# Patient Record
Sex: MALE | Race: WHITE | NOT HISPANIC OR LATINO | Employment: OTHER | URBAN - METROPOLITAN AREA
[De-identification: names, ages, dates, MRNs, and addresses within clinical notes are randomized per-mention and may not be internally consistent; named-entity substitution may affect disease eponyms.]

---

## 2022-11-09 ENCOUNTER — OFFICE VISIT (OUTPATIENT)
Dept: URGENT CARE | Age: 27
End: 2022-11-09

## 2022-11-09 VITALS
DIASTOLIC BLOOD PRESSURE: 74 MMHG | OXYGEN SATURATION: 98 % | TEMPERATURE: 97.6 F | HEIGHT: 71 IN | HEART RATE: 79 BPM | RESPIRATION RATE: 20 BRPM | SYSTOLIC BLOOD PRESSURE: 122 MMHG | BODY MASS INDEX: 28.22 KG/M2 | WEIGHT: 201.6 LBS

## 2022-11-09 DIAGNOSIS — U07.1 COVID: Primary | ICD-10-CM

## 2022-11-09 LAB
SARS-COV-2 AG UPPER RESP QL IA: POSITIVE
VALID CONTROL: ABNORMAL

## 2022-11-09 RX ORDER — PREDNISONE 10 MG/1
40 TABLET ORAL DAILY
Qty: 40 TABLET | Refills: 0 | Status: SHIPPED | OUTPATIENT
Start: 2022-11-09 | End: 2022-11-19

## 2022-11-09 RX ORDER — BUSPIRONE HYDROCHLORIDE 15 MG/1
15 TABLET ORAL
COMMUNITY

## 2022-11-09 NOTE — LETTER
[facility] [address]  [address] [address]  Dept: [phone]    November 9, 2022    Patient: Miya Hung  YOB: 1995    Miya Hung was seen and evaluated at our [facility]  Please note if Covid and Flu tests are negative, they may return to work when fever free for 24 hours without the use of a fever reducing agent  If Covid or Flu test is positive, they may return to work on 11/12/2022, as this is 5 days from the onset of symptoms  Upon return, they must then adhere to strict masking for an additional 5 days      Sincerely,    Pinky Craig PA-C

## 2022-11-09 NOTE — PATIENT INSTRUCTIONS
Prescribed course of steroids due to history of asthma and wheezing in upper lung fields  Take as directed  Fever/Body Aches: OTC Tylenol, ibuprofen, motrin as directed  Cough: OTC Robitussin or Delsym cough syrup  Sore Throat: Warm saltwater gargles, honey, drink plenty of liquids, soft foods  If severe, can utilize OTC chloraseptic spray  Nasal Congestion: OTC saline nasal spray use as directed, OTC decongestants such as Sudafed  COVID-19 (Coronavirus Disease 2019)   AMBULATORY CARE:   What you need to know about COVID-19:  COVID-19 is the disease caused by a coronavirus first discovered in December 2019  Coronaviruses generally cause upper respiratory (nose, throat, and lung) infections, such as a cold  The 2019 virus spreads quickly and easily  It can be spread starting 2 to 3 days before symptoms even begin  What you need to know about variants: The virus has changed into several new forms (called variants) since it was discovered  The variants may be more contagious (easily spread) than the original form  Some may also cause more severe illness than others  Signs and symptoms of COVID-19  may not develop  Signs and symptoms usually start about 5 days after infection but can take 2 to 14 days  You may feel like you have the flu or a bad cold  Some signs and symptoms go away in a few days  Others can last weeks, months, or possibly years   You may have any of the following:  A cough    Shortness of breath or trouble breathing that may become severe    A fever    Chills that might include shaking    Muscle pain, body aches, or a headache    A sore throat    Sudden changes or loss of your taste or smell    Feeling mentally and physically tired (fatigue)    Congestion (stuffy head and nose), or a runny nose    Diarrhea, nausea, or vomiting    Call your local emergency number (911 in the [facility]) if:   You have trouble breathing or shortness of breath at rest     You have chest pain or pressure that lasts longer than 5 minutes  You become confused or hard to wake  Your lips or face are blue  Seek care immediately if:   You have a fever of 104°F (40°C) or higher  Call your doctor if:   You have symptoms of COVID-19  You have questions or concerns about your condition or care  How COVID-19 is diagnosed:  Testing is offered at many sites  You may need to quarantine until you get your results  Any of the following tests may be used:  A viral PCR test  shows if you have a current infection  A sample is taken from your nose or throat with a swab  You may need to wait 1 or more days to get the test results  An antigen test  shows if you have a protein from the COVID-19 virus  This test is often called a rapid test because the results can be available in 30 minutes or less  An antibody test  shows if you had a recent or past infection  Blood samples are used for this test  Antibodies are made by your immune system to fight the virus that causes COVID-19  Antibodies form 1 to 3 weeks after you are infected  This test is not used to show if you are immune to the virus  A CT, MRI, ultrasound, or x-ray  may be used to check for complications of [id number]  These may include pneumonia, blood clots, or other complications  Treatment:   Mild symptoms  may get better on their own  Some treatments have emergency use authorization (EUA)  Examples include monoclonal antibodies and convalescent plasma  These may be given to help prevent worsening of your symptoms  You may also need any of the following:    Decongestants  help reduce nasal congestion and help you breathe more easily  If you take decongestant pills, they may make you feel restless or cause problems with your sleep  Do not use decongestant sprays for more than a few days  Cough suppressants  help reduce coughing  Ask your healthcare provider which type of cough medicine is best for you      To soothe a sore throat,  gargle with warm salt water, or use throat lozenges or a throat spray  Drink more liquids to thin and loosen mucus and to prevent dehydration  NSAIDs or acetaminophen  can help lower a fever and relieve body aches or a headache  Follow directions  If not taken correctly, NSAIDs can cause kidney damage and acetaminophen can cause liver damage  Severe or life-threatening symptoms  are treated in the hospital  You may need any of the following:     Medicines  may be given to fight the virus or treat inflammation  Blood thinners  help prevent or treat blood clots  If you have a deep vein thrombosis (DVT) or pulmonary embolism (PE), you may need to use blood thinners for at least 3 months  Extra oxygen  may be given if you have respiratory failure  This means your lungs cannot get enough oxygen into your blood and out to your organs  A ventilator  may be used to help you breathe  What you need to know about health problems the virus may cause: You may develop long-term health problems caused by the virus  Your risk is higher if you are 65 or older  A weak immune system, obesity, diabetes, chronic kidney disease, or a heart or lung condition can also increase your risk  Your risk is also higher if you are a current or former cigarette smoker   COVID-19 can lead to any of the following:  Multisymptom inflammatory syndrome in adults (MIS-A) or in children (MIS-C), causing inflammation in the heart, digestive system, skin, or brain    Shortness of breath, serious lower respiratory conditions, such as pneumonia or acute respiratory distress syndrome (ARDS)    Blood clots or blood vessel damage    Organ damage from a lack of oxygen or from blood clots    Sleep problems    Problems thinking clearly, remembering information, or concentrating    Mood changes, depression, or anxiety    Long-term problems tasting or smelling    Loss of appetite and weight loss    Nerve pain    Fatigue (feeling mentally and physically tired)    What you need to know about COVID-19 vaccines:  Healthcare providers recommend a COVID-19 vaccine, even if you have already had COVID-19  You are considered fully vaccinated against COVID-19 two weeks after the final dose of any COVID-19 vaccine  Let your healthcare provider know when you have received the final dose of the vaccine  Make a copy of your vaccination card  Keep the original with you in case you need to show it  Keep the copy in a safe place  COVID-19 vaccines are given as a shot in 1 or 2 doses  Vaccination is recommended for everyone 5 years or older  One 2-dose vaccine is fully approved  for those 16 years or older  This vaccine also has an emergency use authorization (EUA) for children 11to 13years old  No vaccine is currently available for children younger than 5 years  A booster (additional) dose  is given to help the immune system continue to protect against severe COVID-19  A booster is recommended for all adults 18 or older  The booster can be a different brand of the COVID-19 vaccine than you originally received  The timing for the booster depends on the type of vaccine you received:    1-dose vaccine: The booster is given at least 2 months after you received the vaccine  2-dose vaccine: The booster is given at least 5 or 6 months after the second dose  A booster can be given to adolescents 15to 16years old  Only 1 COVID-19 vaccine has this EUA  The booster is given at least 5 months after the second dose of the original vaccine series  A booster is recommended for immunocompromised children 11to 6years old  Only 1 COVID-19 vaccine has this EUA  The booster is given 28 days after the second dose  Continue social distancing and other measures, even after you get the vaccine  Although it is not common, you can become infected after you get the vaccine  You may also be able to pass the virus to others without knowing you are infected      After you get the vaccine, check local, national, and international travel rules  You may need to be tested before you travel  Some countries require proof of a negative test before you travel  You may also need to quarantine after you return  Medicine may be given to prevent infection  The medicine can be given if you are at high risk for infection and cannot get the vaccine  It can also be given if your immune system does not respond well to the vaccine  How the 2019 coronavirus spreads:   Droplets are the main way all coronaviruses spread  The virus travels in droplets that form when a person talks, sings, coughs, or sneezes  The droplets can also float in the air for minutes or hours  Infection happens when you breathe in the droplets or get them in your eyes or nose  Close personal contact with an infected person increases your risk for infection  This means being within 6 feet (2 meters) of the person for at least 15 minutes over 24 hours  Person-to-person contact can spread the virus  For example, a person with the virus on his or her hands can spread it by shaking hands with someone  The virus can stay on objects and surfaces for up to 3 days  You may become infected by touching the object or surface and then touching your eyes or mouth  Help lower the risk for COVID-19:   Wash your hands often throughout the day  Use soap and water  Rub your soapy hands together, lacing your fingers, for at least 20 seconds  Rinse with warm, running water  Dry your hands with a clean towel or paper towel  Use hand  that contains alcohol if soap and water are not available  Teach children how to wash their hands and use hand   Cover sneezes and coughs  Turn your face away and cover your mouth and nose with a tissue  Throw the tissue away  Use the bend of your arm if a tissue is not available  Then wash your hands well with soap and water or use hand    Teach children how to cover a cough or sneeze  Wear a face covering (mask) when needed  Use a cloth covering with at least 2 layers  You can also create layers by putting a cloth covering over a disposable non-medical mask  Cover your mouth and your nose  Follow worldwide, national, and local social distancing guidelines  Keep at least 6 feet (2 meters) between you and others  Try not to touch your face  If you get the virus on your hands, you can transfer it to your eyes, nose, or mouth and become infected  You can also transfer it to objects, surfaces, or people  Clean and disinfect high-touch surfaces and objects often  Use disinfecting wipes, or make a solution of 4 teaspoons of bleach in 1 quart (4 cups) of water  Ask about other vaccines you may need  Get the influenza (flu) vaccine as soon as recommended each year, usually starting in September or October  Get the pneumonia vaccine if recommended  Your healthcare provider can tell you if you should also get other vaccines, and when to get them  Follow social distancing guidelines:  National and local social distancing rules vary  Rules and restrictions may change over time as restrictions are lifted  The following are general guidelines:  Stay home if you are sick or think you may have COVID-19  It is important to stay home if you are waiting for a testing appointment or for test results  Avoid close physical contact with anyone who does not live in your home  Do not shake hands with, hug, or kiss a person as a greeting  If you must use public transportation (such as a bus or subway), try to sit or stand away from others  Wear your face covering  Avoid in-person gatherings and crowds  Attend virtually if possible  Follow up with your doctor as directed:  Write down your questions so you remember to ask them during your visits    For more information:   [facility]  [address] , [address]  Phone: [phone]  Web Address: [employer]    © [employer] 2022 Information is for End User's use only and may not be sold, redistributed or otherwise used for commercial purposes  All illustrations and images included in CareNotes® are the copyrighted property of A D A M , Inc  or [facility] Lalo Fernandez   The above information is an  only  It is not intended as medical advice for individual conditions or treatments  Talk to your doctor, nurse or pharmacist before following any medical regimen to see if it is safe and effective for you

## 2022-11-09 NOTE — PROGRESS NOTES
[facility]        NAME: Felicity Cranker is a 33 y o  male  : 1995    MRN: [id number]  DATE: 2022  TIME: 9:57 AM    Assessment and Plan   COVID [U07 1]  1  COVID  Poct Covid 19 Rapid Antigen Test    predniSONE 10 mg tablet     Prescribed course of steroids due to history of asthma and wheezing in upper lung fields  Take as directed  Rapid COVID test positive  Fever/Body Aches: OTC Tylenol, ibuprofen, motrin as directed  Cough: OTC Robitussin or Delsym cough syrup  Sore Throat: Warm saltwater gargles, honey, drink plenty of liquids, soft foods  If severe, can utilize OTC chloraseptic spray  Nasal Congestion: OTC saline nasal spray use as directed, OTC decongestants such as Sudafed  Patient Instructions       Follow up with PCP in 3-5 days  Proceed to  ER if symptoms worsen  Chief Complaint     Chief Complaint   Patient presents with   • Cold Like Symptoms     Pt started 2-3 days ago with nasal congestion, cough, ear fullness, throat irritation, hot/cold sweats  Brother tested positive for Covid who is a coworker  Employer requiring Covid test   No OTC meds taken  Denies fevers  History of Present Illness       Sore Throat   This is a new problem  Episode onset: About 2-3 days ago  The problem has been unchanged  Neither side of throat is experiencing more pain than the other  There has been no fever  The pain is mild  Associated symptoms include congestion, coughing (Productive), a plugged ear sensation and shortness of breath (With coughing, also has history of asthma)  Pertinent negatives include no abdominal pain, diarrhea, ear discharge, ear pain or vomiting  Exposure to: COVID  He has tried nothing for the symptoms  Review of Systems   Review of Systems   Constitutional: Positive for appetite change (Decreased), chills and fatigue  Negative for fever  HENT: Positive for congestion, postnasal drip, rhinorrhea, sinus pressure and sore throat   Negative for ear discharge, ear pain and sinus pain  Eyes: Positive for pain, discharge (Watery), redness and itching  Respiratory: Positive for cough (Productive), shortness of breath (With coughing, also has history of asthma) and wheezing (Minimal)  Negative for chest tightness  Gastrointestinal: Negative for abdominal pain, diarrhea, nausea and vomiting  Musculoskeletal: Positive for myalgias  Skin: Negative for rash  Current Medications       Current Outpatient Medications:   •  albuterol (PROVENTIL HFA,VENTOLIN HFA) 90 mcg/act inhaler, Inhale 2 puffs every 4 (four) hours as needed for wheezing or shortness of breath , Disp: 1 Inhaler, Rfl: 0  •  busPIRone (BUSPAR) 15 mg tablet, Take 15 mg by mouth, Disp: , Rfl:   •  loratadine (CLARITIN) 10 mg tablet, Take 10 mg by mouth daily  , Disp: , Rfl:   •  predniSONE 10 mg tablet, Take 4 tablets (40 mg total) by mouth daily for 10 days, Disp: 40 tablet, Rfl: 0  •  ranitidine (ZANTAC) 150 mg tablet, Take 75 mg by mouth daily  , Disp: , Rfl:     Current Allergies     Allergies as of 11/09/2022   • (No Known Allergies)            The following portions of the patient's history were reviewed and updated as appropriate: allergies, current medications, past family history, past medical history, past social history, past surgical history and problem list      Past Medical History:   Diagnosis Date   • Anxiety    • Asthma    • Depression        Past Surgical History:   Procedure Laterality Date   • ADENOIDECTOMY         History reviewed  No pertinent family history  Medications have been verified  Objective   /74   Pulse 79   Temp 97 6 °F (36 4 °C)   Resp 20   Ht 5' 11" (1 803 m)   Wt 91 4 kg (201 lb 9 6 oz)   SpO2 98%   BMI 28 12 kg/m²        Physical Exam     Physical Exam  Constitutional:       General: He is not in acute distress  Appearance: Normal appearance  He is not ill-appearing     HENT:      Right Ear: Tympanic membrane, ear canal and external ear normal       Left Ear: Tympanic membrane, ear canal and external ear normal       Nose: Congestion and rhinorrhea present  Mouth/Throat:      Mouth: Mucous membranes are moist       Pharynx: Posterior oropharyngeal erythema present  No oropharyngeal exudate  Eyes:      General: Scleral icterus present  Right eye: No discharge  Left eye: No discharge  Extraocular Movements: Extraocular movements intact  Pupils: Pupils are equal, round, and reactive to light  Comments: Injected conjunctiva   Cardiovascular:      Rate and Rhythm: Normal rate and regular rhythm  Pulses: Normal pulses  Heart sounds: Normal heart sounds  Pulmonary:      Effort: Pulmonary effort is normal  No respiratory distress  Breath sounds: Wheezing (Upper lung fields bilaterally) present  Abdominal:      General: Abdomen is flat  Bowel sounds are normal  There is no distension  Palpations: Abdomen is soft  Tenderness: There is no abdominal tenderness  There is no guarding  Musculoskeletal:      Cervical back: Neck supple  Lymphadenopathy:      Cervical: No cervical adenopathy  Skin:     General: Skin is warm and dry  Neurological:      Mental Status: He is alert

## 2024-07-13 ENCOUNTER — HOSPITAL ENCOUNTER (EMERGENCY)
Facility: HOSPITAL | Age: 29
Discharge: HOME | End: 2024-07-13
Attending: EMERGENCY MEDICINE
Payer: COMMERCIAL

## 2024-07-13 VITALS
DIASTOLIC BLOOD PRESSURE: 96 MMHG | OXYGEN SATURATION: 100 % | SYSTOLIC BLOOD PRESSURE: 183 MMHG | TEMPERATURE: 97.8 F | WEIGHT: 152.12 LBS | BODY MASS INDEX: 21.22 KG/M2 | RESPIRATION RATE: 18 BRPM | HEART RATE: 101 BPM

## 2024-07-13 DIAGNOSIS — Z00.8 MEDICAL CLEARANCE FOR INCARCERATION: Primary | ICD-10-CM

## 2024-07-13 PROCEDURE — 99282 EMERGENCY DEPT VISIT SF MDM: CPT

## 2024-07-13 PROCEDURE — 99283 EMERGENCY DEPT VISIT LOW MDM: CPT | Performed by: EMERGENCY MEDICINE

## 2024-07-13 NOTE — DISCHARGE INSTRUCTIONS
Medically cleared for incarceration  With your urologist if you start having redness swelling again in her groin area or fever new problems or concerns

## 2024-07-13 NOTE — ED PROVIDER NOTES
History  Chief Complaint   Patient presents with    Medical Problem     Pt arrives in police custody for medical clearance. States he had an infection in groin area 6 mo ago for which he was on abx for.     Patient brought in the police for medical clearance patient's been asking for a large whole time patient had a perineal abscess in the groin area back in May that was I&D he was on antibiotics at that time patient's local complaint is that he has MRSA in his groin denies any fever swelling or redness to the area      Medical Problem  Associated symptoms: no abdominal pain, no chest pain, no fever, no rash and no shortness of breath        Prior to Admission Medications   Prescriptions Last Dose Informant Patient Reported? Taking?   albuterol (PROVENTIL HFA,VENTOLIN HFA) 90 mcg/act inhaler   No No   Sig: Inhale 2 puffs every 4 (four) hours as needed for wheezing or shortness of breath.   busPIRone (BUSPAR) 15 mg tablet   Yes No   Sig: Take 15 mg by mouth   loratadine (CLARITIN) 10 mg tablet   Yes No   Sig: Take 10 mg by mouth daily.   ranitidine (ZANTAC) 150 mg tablet   Yes No   Sig: Take 75 mg by mouth daily.      Facility-Administered Medications: None       Past Medical History:   Diagnosis Date    Anxiety     Asthma     Depression        Past Surgical History:   Procedure Laterality Date    ADENOIDECTOMY         History reviewed. No pertinent family history.  I have reviewed and agree with the history as documented.    E-Cigarette/Vaping     E-Cigarette/Vaping Substances     Social History     Tobacco Use    Smoking status: Every Day     Current packs/day: 1.00     Types: Cigarettes   Substance Use Topics    Alcohol use: No    Drug use: Yes     Types: Methamphetamines       Review of Systems   Constitutional:  Negative for chills and fever.   Respiratory:  Negative for shortness of breath.    Cardiovascular:  Positive for palpitations. Negative for chest pain.   Gastrointestinal:  Negative for abdominal pain.    Genitourinary:  Negative for dysuria and hematuria.   Musculoskeletal:  Negative for back pain.   Skin:  Negative for rash.   Neurological:  Negative for seizures and syncope.   Psychiatric/Behavioral:  Negative for confusion.    All other systems reviewed and are negative.      Physical Exam  Physical Exam  Vitals and nursing note reviewed.   Constitutional:       General: He is not in acute distress.     Appearance: He is well-developed.   HENT:      Head: Normocephalic and atraumatic.   Eyes:      Extraocular Movements: Extraocular movements intact.      Conjunctiva/sclera: Conjunctivae normal.   Cardiovascular:      Rate and Rhythm: Normal rate and regular rhythm.      Heart sounds: No murmur heard.  Pulmonary:      Effort: Pulmonary effort is normal. No respiratory distress.      Breath sounds: Normal breath sounds.   Abdominal:      Palpations: Abdomen is soft.      Tenderness: There is no abdominal tenderness.   Genitourinary:     Comments: Patient has no erythema no swelling to the groin area on exam no tenderness  Musculoskeletal:         General: No swelling.      Cervical back: Neck supple.   Skin:     General: Skin is warm and dry.      Capillary Refill: Capillary refill takes less than 2 seconds.   Neurological:      General: No focal deficit present.      Mental Status: He is alert.   Psychiatric:         Mood and Affect: Mood normal.         Vital Signs  ED Triage Vitals [07/13/24 1045]   Temperature Pulse Respirations Blood Pressure SpO2   97.8 °F (36.6 °C) 101 18 (!) 183/96 100 %      Temp Source Heart Rate Source Patient Position - Orthostatic VS BP Location FiO2 (%)   Oral Monitor Sitting Left arm --      Pain Score       --           Vitals:    07/13/24 1045   BP: (!) 183/96   Pulse: 101   Patient Position - Orthostatic VS: Sitting         Visual Acuity      ED Medications  Medications - No data to display    Diagnostic Studies  Results Reviewed       None                   No orders to display               Procedures  Procedures         ED Course                                               Medical Decision Making  Patient has no evidence of cellulitis or perineal abscess on exam of his groin she is medically cleared from that standpoint                 Disposition  Final diagnoses:   Medical clearance for incarceration     Time reflects when diagnosis was documented in both MDM as applicable and the Disposition within this note       Time User Action Codes Description Comment    7/13/2024 11:06 AM Stone Siddiqui Add [Z00.8] Medical clearance for incarceration           ED Disposition       ED Disposition   Discharge    Condition   Stable    Date/Time   Sat Jul 13, 2024 11:05 AM    Comment   Jose Alberto Reed discharge to home/self care.                   Follow-up Information       Follow up With Specialties Details Why Contact Info Additional Information    [facility] In 1 week  [address] [place] [address] [phone] [facility], [address] [place] [address], [address] [phone]            Patient's Medications   Discharge Prescriptions    No medications on file       No discharge procedures on file.    PDMP Review       None            ED Provider  Electronically Signed by             Stone Siddiqui MD  07/13/24 5108

## 2025-02-16 ENCOUNTER — HOSPITAL ENCOUNTER (EMERGENCY)
Facility: HOSPITAL | Age: 30
Discharge: HOME | End: 2025-02-16
Attending: EMERGENCY MEDICINE | Admitting: EMERGENCY MEDICINE
Payer: COMMERCIAL

## 2025-02-16 VITALS
BODY MASS INDEX: 26.04 KG/M2 | WEIGHT: 186.73 LBS | OXYGEN SATURATION: 99 % | DIASTOLIC BLOOD PRESSURE: 74 MMHG | HEART RATE: 70 BPM | TEMPERATURE: 98.3 F | SYSTOLIC BLOOD PRESSURE: 133 MMHG | RESPIRATION RATE: 20 BRPM

## 2025-02-16 DIAGNOSIS — J45.21 MILD INTERMITTENT ASTHMA WITH EXACERBATION: Primary | ICD-10-CM

## 2025-02-16 LAB
FLUAV RNA RESP QL NAA+PROBE: NEGATIVE
FLUBV RNA RESP QL NAA+PROBE: NEGATIVE
RSV RNA RESP QL NAA+PROBE: NEGATIVE
SARS-COV-2 RNA RESP QL NAA+PROBE: NEGATIVE

## 2025-02-16 PROCEDURE — 99283 EMERGENCY DEPT VISIT LOW MDM: CPT

## 2025-02-16 PROCEDURE — 94640 AIRWAY INHALATION TREATMENT: CPT

## 2025-02-16 PROCEDURE — 99284 EMERGENCY DEPT VISIT MOD MDM: CPT | Performed by: EMERGENCY MEDICINE

## 2025-02-16 PROCEDURE — 0241U HB NFCT DS VIR RESP RNA 4 TRGT: CPT | Performed by: EMERGENCY MEDICINE

## 2025-02-16 RX ORDER — IPRATROPIUM BROMIDE AND ALBUTEROL SULFATE 2.5; .5 MG/3ML; MG/3ML
3 SOLUTION RESPIRATORY (INHALATION) ONCE
Status: COMPLETED | OUTPATIENT
Start: 2025-02-16 | End: 2025-02-16

## 2025-02-16 RX ORDER — ALBUTEROL SULFATE 90 UG/1
2 INHALANT RESPIRATORY (INHALATION) EVERY 4 HOURS PRN
Qty: 8 G | Refills: 0 | Status: SHIPPED | OUTPATIENT
Start: 2025-02-16

## 2025-02-16 RX ORDER — ALBUTEROL SULFATE 90 UG/1
2 INHALANT RESPIRATORY (INHALATION) ONCE
Status: COMPLETED | OUTPATIENT
Start: 2025-02-16 | End: 2025-02-16

## 2025-02-16 RX ORDER — PREDNISONE 10 MG/1
50 TABLET ORAL DAILY
Qty: 25 TABLET | Refills: 0 | Status: SHIPPED | OUTPATIENT
Start: 2025-02-16 | End: 2025-02-21

## 2025-02-16 RX ORDER — IPRATROPIUM BROMIDE AND ALBUTEROL SULFATE 2.5; .5 MG/3ML; MG/3ML
3 SOLUTION RESPIRATORY (INHALATION) 4 TIMES DAILY
Qty: 360 ML | Refills: 0 | Status: SHIPPED | OUTPATIENT
Start: 2025-02-16

## 2025-02-16 RX ADMIN — ALBUTEROL SULFATE 2 PUFF: 90 AEROSOL, METERED RESPIRATORY (INHALATION) at 00:39

## 2025-02-16 RX ADMIN — IPRATROPIUM BROMIDE AND ALBUTEROL SULFATE 3 ML: 2.5; .5 SOLUTION RESPIRATORY (INHALATION) at 00:40

## 2025-02-16 RX ADMIN — PREDNISONE 50 MG: 20 TABLET ORAL at 00:39

## 2025-02-16 RX ADMIN — IPRATROPIUM BROMIDE AND ALBUTEROL SULFATE 3 ML: 2.5; .5 SOLUTION RESPIRATORY (INHALATION) at 01:09

## 2025-02-16 NOTE — ED PROVIDER NOTES
Time reflects when diagnosis was documented in both MDM as applicable and the Disposition within this note       Time User Action Codes Description Comment    2/16/2025 12:34 AM Scottie Saldivar [J45.21] Mild intermittent asthma with exacerbation           ED Disposition       ED Disposition   Discharge    Condition   Stable    Date/Time   Sun Feb 16, 2025  1:53 AM    Comment   Jose Alberto Mcbride discharge to home/self care.                   Assessment & Plan       Medical Decision Making  Risk  Prescription drug management.      Clinical lab tests: not ordered for simple asthma exacerbation.   Reviewed past medical records: yes, no recent intubations or hospitalizations  Independent visualization of images: imaging not indicated, no fever, productive cough or exam findings to suggest severe exacerbation in setting of pna or infection    History Provided by patient    Differential considered: asthma exacerbation, no signs of infection / fever to suggest pna.    Patient given duoneb, started steroids, with resolution of symptoms, will defer inpatient admission at this time. Resting comfortably, speaking in full sentences, no signs of respiratory distress. Prescribed meds, stable for outpatient follow up.    The patient was instructed to follow up as documented. Strict return precautions were discussed with the patient and the patient was instructed to return to the emergency department immediately if symptoms worsen. The patient/patient family member acknowledged and were in agreement with plan.          Medications   ipratropium-albuterol (DUO-NEB) 0.5-2.5 mg/3 mL inhalation solution 3 mL (3 mL Nebulization Given 2/16/25 0040)   predniSONE tablet 50 mg (50 mg Oral Given 2/16/25 0039)   albuterol (PROVENTIL HFA,VENTOLIN HFA) inhaler 2 puff (2 puffs Inhalation Given 2/16/25 0039)   ipratropium-albuterol (DUO-NEB) 0.5-2.5 mg/3 mL inhalation solution 3 mL (3 mL Nebulization Given 2/16/25 0109)       ED Risk Strat Scores                             SBIRT 20yo+      Flowsheet Row Most Recent Value   Initial Alcohol Screen: US AUDIT-C     1. How often do you have a drink containing alcohol? 0 Filed at: 02/16/2025 0019   2. How many drinks containing alcohol do you have on a typical day you are drinking?  0 Filed at: 02/16/2025 0019   3a. Male UNDER 65: How often do you have five or more drinks on one occasion? 0 Filed at: 02/16/2025 0019   Audit-C Score 0 Filed at: 02/16/2025 0019   LUIS MIGUEL: How many times in the past year have you...    Used an illegal drug or used a prescription medication for non-medical reasons? Never Filed at: 02/16/2025 0019                            History of Present Illness       Chief Complaint   Patient presents with    Asthma     Pt having asthma exacerbation, pt has been using his maintance inhaler without relief, states he ran out of his rescue inhaler and nebulizer solution. Pt took his prednisone around 9pm.       30 yo M hx of mild intermittent asthma presents to ed for eval of sob.  Duration of symptoms: 2 days  Had a prior intubation for asthma as a child.   Speaking in full sentences without difficulty.   +cough no fever.   Denies chest pain.  Smoking: pack a day, and THC  Last steroid use: 40 mg pta (left over from prior prescription)  Next pulmonologist appt: n/a, looking for one      Past Medical History:   Diagnosis Date    Anxiety     Asthma     Depression       Past Surgical History:   Procedure Laterality Date    ADENOIDECTOMY        History reviewed. No pertinent family history.   Social History     Tobacco Use    Smoking status: Every Day     Current packs/day: 1.00     Types: Cigarettes   Substance Use Topics    Alcohol use: No    Drug use: Yes     Types: Methamphetamines      E-Cigarette/Vaping      E-Cigarette/Vaping Substances      I have reviewed and agree with the history as documented.     HPI    Review of Systems   Constitutional:  Negative for chills, fatigue and fever.   HENT:   Negative for nosebleeds and sore throat.    Eyes:  Negative for redness and visual disturbance.   Respiratory:  Positive for cough, shortness of breath and wheezing.    Cardiovascular:  Negative for chest pain and palpitations.   Gastrointestinal:  Negative for abdominal pain and diarrhea.   Endocrine: Negative for polyuria.   Genitourinary:  Negative for difficulty urinating and testicular pain.   Musculoskeletal:  Negative for back pain and neck stiffness.   Skin:  Negative for rash and wound.   Neurological:  Negative for seizures, speech difficulty and headaches.   Psychiatric/Behavioral:  Negative for dysphoric mood and hallucinations.    All other systems reviewed and are negative.          Objective       ED Triage Vitals [02/16/25 0018]   Temperature Pulse Blood Pressure Respirations SpO2 Patient Position - Orthostatic VS   98.3 °F (36.8 °C) 88 133/74 (!) 24 93 % Sitting      Temp Source Heart Rate Source BP Location FiO2 (%) Pain Score    Oral Monitor Left arm -- --      Vitals      Date and Time Temp Pulse SpO2 Resp BP Pain Score FACES Pain Rating User   02/16/25 0117 -- 70 99 % 20 -- -- -- [place]   02/16/25 0018 98.3 °F (36.8 °C) 88 93 % 24 133/74 -- -- [place]            Physical Exam  Vitals and nursing note reviewed.   Constitutional:       Appearance: He is well-developed.   HENT:      Head: Normocephalic and atraumatic.      Right Ear: External ear normal.      Left Ear: External ear normal.   Eyes:      Conjunctiva/sclera: Conjunctivae normal.   Cardiovascular:      Rate and Rhythm: Normal rate and regular rhythm.      Heart sounds: Normal heart sounds.   Pulmonary:      Effort: Pulmonary effort is normal. No respiratory distress.      Breath sounds: No stridor. Wheezing present. No rhonchi.   Abdominal:      General: There is no distension.      Tenderness: There is no guarding.   Musculoskeletal:         General: Normal range of motion.      Cervical back: Normal range of motion.   Skin:     General: Skin  is warm and dry.      Findings: No rash.   Neurological:      Mental Status: He is alert and oriented to person, place, and time.      Cranial Nerves: No cranial nerve deficit.      Sensory: No sensory deficit.      Motor: No abnormal muscle tone.      Coordination: Coordination normal.         Results Reviewed       Procedure Component Value Units Date/Time    FLU/RSV/COVID - if FLU/RSV clinically relevant (2hr TAT) [[id number]]  (Normal) Collected: 02/16/25 0042    Lab Status: Final result Specimen: Nares from Nose Updated: 02/16/25 0129     SARS-CoV-2 Negative     INFLUENZA A PCR Negative     INFLUENZA B PCR Negative     RSV PCR Negative    Narrative:      This test has been performed using the CoV-2/Flu/RSV plus assay on the [facility] [employer] platform. This test has been validated by the  and verified by the performing laboratory.     This test is designed to amplify and detect the following: nucleocapsid (N), envelope (E), and RNA-dependent RNA polymerase (RdRP) genes of the SARS-CoV-2 genome; matrix (M), basic polymerase (PB2), and acidic protein (PA) segments of the influenza A genome; matrix (M) and non-structural protein (NS) segments of the influenza B genome, and the nucleocapsid genes of RSV A and RSV B.     Positive results are indicative of the presence of Flu A, Flu B, RSV, and/or SARS-CoV-2 RNA. Positive results for SARS-CoV-2 or suspected novel influenza should be reported to state, local, or federal health departments according to local reporting requirements.      All results should be assessed in conjunction with clinical presentation and other laboratory markers for clinical management.     FOR PEDIATRIC PATIENTS - copy/paste COVID Guidelines URL to browser: [email or web address]               No orders to display       Procedures    ED Medication and Procedure Management   Prior to Admission Medications   Prescriptions Last Dose  Informant Patient Reported? Taking?   albuterol (PROVENTIL HFA,VENTOLIN HFA) 90 mcg/act inhaler   No No   Sig: Inhale 2 puffs every 4 (four) hours as needed for wheezing or shortness of breath.   albuterol (PROVENTIL HFA,VENTOLIN HFA) 90 mcg/act inhaler   No Yes   Sig: Inhale 2 puffs every 4 (four) hours as needed for wheezing or shortness of breath   busPIRone (BUSPAR) 15 mg tablet   Yes No   Sig: Take 15 mg by mouth   loratadine (CLARITIN) 10 mg tablet   Yes No   Sig: Take 10 mg by mouth daily.   ranitidine (ZANTAC) 150 mg tablet   Yes No   Sig: Take 75 mg by mouth daily.      Facility-Administered Medications: None     Discharge Medication List as of 2/16/2025  1:54 AM        START taking these medications    Details   ipratropium-albuterol (DUO-NEB) 0.5-2.5 mg/3 mL nebulizer solution Take 3 mL by nebulization 4 (four) times a day, Starting Sun 2/16/2025, Normal      predniSONE 10 mg tablet Take 5 tablets (50 mg total) by mouth daily for 5 days, Starting Sun 2/16/2025, Until Fri 2/21/2025, Normal           CONTINUE these medications which have CHANGED    Details   albuterol (PROVENTIL HFA,VENTOLIN HFA) 90 mcg/act inhaler Inhale 2 puffs every 4 (four) hours as needed for wheezing or shortness of breath, Starting Sun 2/16/2025, Normal           CONTINUE these medications which have NOT CHANGED    Details   busPIRone (BUSPAR) 15 mg tablet Take 15 mg by mouth, Historical Med      loratadine (CLARITIN) 10 mg tablet Take 10 mg by mouth daily., Until Discontinued, Historical Med      ranitidine (ZANTAC) 150 mg tablet Take 75 mg by mouth daily., Until Discontinued, Historical Med           No discharge procedures on file.  ED SEPSIS DOCUMENTATION   Time reflects when diagnosis was documented in both MDM as applicable and the Disposition within this note       Time User Action Codes Description Comment    2/16/2025 12:34 AM Scottie Saldivar Add [J45.21] Mild intermittent asthma with exacerbation                  Scottie Saldivar  MD  02/18/25 2534